# Patient Record
Sex: FEMALE | Race: OTHER
[De-identification: names, ages, dates, MRNs, and addresses within clinical notes are randomized per-mention and may not be internally consistent; named-entity substitution may affect disease eponyms.]

---

## 2020-03-18 ENCOUNTER — HOSPITAL ENCOUNTER (EMERGENCY)
Dept: HOSPITAL 62 - ER | Age: 40
Discharge: HOME | End: 2020-03-18
Payer: SELF-PAY

## 2020-03-18 VITALS — DIASTOLIC BLOOD PRESSURE: 65 MMHG | SYSTOLIC BLOOD PRESSURE: 119 MMHG

## 2020-03-18 DIAGNOSIS — A09: Primary | ICD-10-CM

## 2020-03-18 DIAGNOSIS — R10.84: ICD-10-CM

## 2020-03-18 DIAGNOSIS — R11.2: ICD-10-CM

## 2020-03-18 DIAGNOSIS — Z79.899: ICD-10-CM

## 2020-03-18 DIAGNOSIS — R10.817: ICD-10-CM

## 2020-03-18 LAB
ADD MANUAL DIFF: NO
ALBUMIN SERPL-MCNC: 5.2 G/DL (ref 3.5–5)
ALP SERPL-CCNC: 87 U/L (ref 38–126)
ANION GAP SERPL CALC-SCNC: 14 MMOL/L (ref 5–19)
APPEARANCE UR: (no result)
APTT PPP: YELLOW S
AST SERPL-CCNC: 35 U/L (ref 14–36)
BASOPHILS # BLD AUTO: 0 10^3/UL (ref 0–0.2)
BASOPHILS NFR BLD AUTO: 0.3 % (ref 0–2)
BILIRUB DIRECT SERPL-MCNC: 0 MG/DL (ref 0–0.4)
BILIRUB SERPL-MCNC: 0.9 MG/DL (ref 0.2–1.3)
BILIRUB UR QL STRIP: NEGATIVE
BUN SERPL-MCNC: 14 MG/DL (ref 7–20)
CALCIUM: 10.4 MG/DL (ref 8.4–10.2)
CHLORIDE SERPL-SCNC: 105 MMOL/L (ref 98–107)
CO2 SERPL-SCNC: 22 MMOL/L (ref 22–30)
EOSINOPHIL # BLD AUTO: 0.1 10^3/UL (ref 0–0.6)
EOSINOPHIL NFR BLD AUTO: 0.5 % (ref 0–6)
ERYTHROCYTE [DISTWIDTH] IN BLOOD BY AUTOMATED COUNT: 14.5 % (ref 11.5–14)
GLUCOSE SERPL-MCNC: 110 MG/DL (ref 75–110)
GLUCOSE UR STRIP-MCNC: NEGATIVE MG/DL
HCT VFR BLD CALC: 36.9 % (ref 36–47)
HGB BLD-MCNC: 12.8 G/DL (ref 12–15.5)
KETONES UR STRIP-MCNC: (no result) MG/DL
LYMPHOCYTES # BLD AUTO: 1 10^3/UL (ref 0.5–4.7)
LYMPHOCYTES NFR BLD AUTO: 6.8 % (ref 13–45)
MCH RBC QN AUTO: 29.1 PG (ref 27–33.4)
MCHC RBC AUTO-ENTMCNC: 34.6 G/DL (ref 32–36)
MCV RBC AUTO: 84 FL (ref 80–97)
MONOCYTES # BLD AUTO: 0.8 10^3/UL (ref 0.1–1.4)
MONOCYTES NFR BLD AUTO: 5.4 % (ref 3–13)
NEUTROPHILS # BLD AUTO: 12.2 10^3/UL (ref 1.7–8.2)
NEUTS SEG NFR BLD AUTO: 87 % (ref 42–78)
PH UR STRIP: 5 [PH] (ref 5–9)
PLATELET # BLD: 346 10^3/UL (ref 150–450)
POTASSIUM SERPL-SCNC: 4.5 MMOL/L (ref 3.6–5)
PROT SERPL-MCNC: 8.6 G/DL (ref 6.3–8.2)
PROT UR STRIP-MCNC: 100 MG/DL
RBC # BLD AUTO: 4.38 10^6/UL (ref 3.72–5.28)
SP GR UR STRIP: 1.03
TOTAL CELLS COUNTED % (AUTO): 100 %
UROBILINOGEN UR-MCNC: NEGATIVE MG/DL (ref ?–2)
WBC # BLD AUTO: 14.1 10^3/UL (ref 4–10.5)

## 2020-03-18 PROCEDURE — 81001 URINALYSIS AUTO W/SCOPE: CPT

## 2020-03-18 PROCEDURE — 96375 TX/PRO/DX INJ NEW DRUG ADDON: CPT

## 2020-03-18 PROCEDURE — 84703 CHORIONIC GONADOTROPIN ASSAY: CPT

## 2020-03-18 PROCEDURE — 96374 THER/PROPH/DIAG INJ IV PUSH: CPT

## 2020-03-18 PROCEDURE — 83690 ASSAY OF LIPASE: CPT

## 2020-03-18 PROCEDURE — 85025 COMPLETE CBC W/AUTO DIFF WBC: CPT

## 2020-03-18 PROCEDURE — 74177 CT ABD & PELVIS W/CONTRAST: CPT

## 2020-03-18 PROCEDURE — 99284 EMERGENCY DEPT VISIT MOD MDM: CPT

## 2020-03-18 PROCEDURE — 96361 HYDRATE IV INFUSION ADD-ON: CPT

## 2020-03-18 PROCEDURE — 36415 COLL VENOUS BLD VENIPUNCTURE: CPT

## 2020-03-18 PROCEDURE — 84702 CHORIONIC GONADOTROPIN TEST: CPT

## 2020-03-18 PROCEDURE — 80053 COMPREHEN METABOLIC PANEL: CPT

## 2020-03-18 NOTE — ER DOCUMENT REPORT
ED Medical Screen (RME)





- General


Chief Complaint: Vomiting


Stated Complaint: VOMITING


Time Seen by Provider: 03/18/20 17:53


Mode of Arrival: Wheelchair


Information source: Patient


Notes: 





39-year-old female presented to ED for complaint of upper abdominal pain nausea 

and vomiting.  Her son states that she has been vomiting constantly for the last

hour.  She states that everything she is eaten today is come back up.  She 

states the only thing she had to eat today as beans and rice nothing spicy.  She

states she did have some abdominal pain and feeling hot yesterday.  She states 

she does not smoke drink or use any alcohol.  She is alert and oriented but is 

in a lot of pain with dry heaves at this time.  She is also been having some 

diarrhea 3-4 stools today.

















I have greeted and performed a rapid initial assessment of this patient.  A 

comprehensive ED assessment and evaluation of the patient, analysis of test 

results and completion of medical decision making process will be conducted by 

an additional ED providers.





Physical Exam





- Vital signs


Vitals: 





                                        











Temp Pulse Resp BP Pulse Ox


 


 98 F   89   24 H  149/76 H  100 


 


 03/18/20 17:50  03/18/20 17:50  03/18/20 17:50  03/18/20 17:50  03/18/20 17:50














Course





- Vital Signs


Vital signs: 





                                        











Temp Pulse Resp BP Pulse Ox


 


 98 F   89   24 H  149/76 H  100 


 


 03/18/20 17:50  03/18/20 17:50  03/18/20 17:50  03/18/20 17:50  03/18/20 17:50

## 2020-03-18 NOTE — ER DOCUMENT REPORT
ED General





- General


Chief Complaint: Abdominal Pain


Stated Complaint: VOMITING


Time Seen by Provider: 03/18/20 17:53


Primary Care Provider: 


GITA BETHEA MD [Primary Care Provider] - Follow up as needed


Mode of Arrival: Wheelchair


Information source: Patient, Relative


TRAVEL OUTSIDE OF THE [place] IN LAST 30 DAYS: No





- HPI


Onset: Other - patient has had abdominal pains for a month but the pain worsened

in the last 2 days and is now associated with nausea and vomiting.


Onset/Duration: Gradual


Quality of pain: Sharp


Severity: Severe


Pain Level: 5


Associated symptoms: Diarrhea, Nausea, Vomiting


Exacerbated by: Food


Relieved by: Denies


Similar symptoms previously: No


Recently seen / treated by doctor: No


Notes: 





39 year old female with no significant PMH here for abdominal pain, nausea, 

vomiting, and diarrhea. The patient has been having abdominal pains for about a 

month but the pains dramatically worsened over the last 2 days and were 

associated with nausea, vomiting, and diarrhea today. The patient has not seen a

doctor in some time and she has never had an abdominal surgery. The patient 

denies urinary and vaginal symptoms.





- Related Data


Allergies/Adverse Reactions: 


                                        





No Known Allergies Allergy (Verified 03/18/20 17:55)


   








Home Medications: vitamins.  cholesterol.  anemia





Past Medical History





- General


Information source: Patient





- Social History


Smoking Status: Never Smoker


Chew tobacco use (# tins/day): No


Frequency of alcohol use: None


Drug Abuse: None


Lives with: Family


Family History: Reviewed & Not Pertinent


Patient has suicidal ideation: No


Patient has homicidal ideation: No





Review of Systems





- Review of Systems


Constitutional: No symptoms reported


EENT: No symptoms reported


Cardiovascular: No symptoms reported


Gastrointestinal: Abdominal pain, Diarrhea, Nausea, Vomiting


Genitourinary: No symptoms reported


Female Genitourinary: No symptoms reported


Musculoskeletal: No symptoms reported


Skin: No symptoms reported


Hematologic/Lymphatic: No symptoms reported


Neurological/Psychological: No symptoms reported


-: Yes All other systems reviewed and negative





Physical Exam





- Vital signs


Vitals: 


                                        











Temp Pulse Resp BP Pulse Ox


 


 98 F   89   24 H  149/76 H  100 


 


 03/18/20 17:50  03/18/20 17:50  03/18/20 17:50  03/18/20 17:50  03/18/20 17:50














- Notes


Notes: 





GENERAL: ill-appearing and in moderate distress, well-nourished


HEAD: Atraumatic, normocephalic.


EYES: Pupils equal round and reactive to light, extraocular movements intact, 

sclera anicteric, conjunctiva are normal.


ENT: Nares patent, oropharynx clear without exudates.  Moist mucous membranes.


NECK: Normal range of motion, supple without lymphadenopathy or JVD.


LUNGS: Breath sounds clear to auscultation bilaterally and equal.  No wheezes 

rales or rhonchi.


HEART: Regular rate and rhythm without murmurs, rubs or gallops.


ABDOMEN: Soft, moderate tenderness throughout but worse in RLQ, normoactive bow

el sounds.  No guarding, no rebound.  No masses appreciated.


EXTREMITIES: Normal range of motion, no pitting or edema.  No clubbing or 

cyanosis.


NEUROLOGICAL: Cranial nerves II through XII grossly intact.  Normal speech, 

normal gait.


PSYCH: Normal mood, normal affect.


SKIN: Warm, Dry, normal turgor, no rashes or lesions noted.





Course





- Re-evaluation


Re-evalutation: 





03/18/20 20:16


The patient is here for abdominal pain, nausea, vomiting. The patient was 

treated with fluids, zofran, morphine, dilaudid, toradol. CT scan consistent 

with a diarrheal illness. Patient likely has a viral syndrome. Will DC on Zofran

and Norco and have the patient follow up with a PCP. Strict ER return 

instructions given if patient worsens.








- Vital Signs


Vital signs: 


                                        











Temp Pulse Resp BP Pulse Ox


 


 98.3 F   89   20   122/61   100 


 


 03/18/20 20:14  03/18/20 20:14  03/18/20 20:14  03/18/20 20:14  03/18/20 20:14














- Laboratory


Result Diagrams: 


                                 03/18/20 18:27





                                 03/18/20 18:27


Laboratory results interpreted by me: 


                                        











  03/18/20 03/18/20 03/18/20





  18:27 18:27 19:10


 


WBC  14.1 H  


 


RDW  14.5 H  


 


Lymph % (Auto)  6.8 L  


 


Absolute Neuts (auto)  12.2 H  


 


Seg Neutrophils %  87.0 H  


 


Calcium   10.4 H 


 


Total Protein   8.6 H 


 


Albumin   5.2 H 


 


Urine Protein    100 H


 


Urine Ketones    TRACE H


 


Urine Ascorbic Acid    40 H














- Diagnostic Test


Radiology reviewed: Image reviewed, Reports reviewed





Discharge





- Discharge


Clinical Impression: 


Diarrhea


Qualifiers:


 Diarrhea type: infectious Qualified Code(s): A09 - Infectious gastroenteritis 

and colitis, unspecified





Abdominal pain


Qualifiers:


 Abdominal location: generalized Qualified Code(s): R10.84 - Generalized 

abdominal pain





Condition: Stable


Disposition: HOME, SELF-CARE


Instructions:  Abdominal Pain ([facility]), Diarrhea, Nonspecific ([facility])


Additional Instructions: 


Use Zofran as needed for nausea. Use over the counter generic Tylenol and Motrin

for pain. Use the prescribed Norco for pain not controlled with over the counter

medications. Drink plenty of fluids in the days to come. Eat a bland diet until 

symptoms pass. If you diarrhea persists for over a week, bring a sample with you

to a primary care doctor's office for stool testing. 


Prescriptions: 


Hydrocodone/Acetaminophen [Norco  Tablet] 1 each PO Q6H #10 tablet


Ondansetron [Zofran Odt 4 mg Tablet] 1 tab PO Q8H PRN #15 tab.rapdis


 PRN Reason: For Nausea/Vomiting


Referrals: 


GITA BETHEA MD [Primary Care Provider] - Follow up as needed

## 2020-03-18 NOTE — RADIOLOGY REPORT (SQ)
EXAM DESCRIPTION: 



CT ABDOMEN PELVIS WITH IV CONTRAST



COMPLETED DATE/TME:  03/18/2020 18:18



CLINICAL HISTORY: 



39 years, Female, eval for appendicitis or other cause of pain



COMPARISON:

None.



TECHNIQUE:

Contrast enhanced CT of the abdomen/pelvis was acquired. Images

were obtained after the uneventful administration of 72 mL of

Omnipaque 350 intravenous contrast.  Images stored on PACS.

 

All CT scanners at this facility use dose modulation, iterative

reconstruction, and/or weight based dosing when appropriate to

reduce radiation dose to as low as reasonably achievable (ALARA).





[facility]: Dose Right [facility]: CareDose   [facility]: Dose Right    [facility]:

Teradose 4D    [facility]: Smart Technologies



LIMITATIONS:

None.



FINDINGS:



Limited evaluation of the lower chest reveals clear lung bases.



The liver, spleen, pancreas, gallbladder, and both adrenal glands

appear normal.



Both kidneys enhance symmetrically. No hydronephrosis or

hydroureter. The urinary bladder is partially collapsed, thus its

evaluation is limited. A lobulated soft tissue density appears to

emanate from the superior aspect of the uterine fundus, best

visualized on image 34 of series 601, most likely indicating a

fundal fibroid.



The colon appears diffusely fluid-filled. However, no significant

wall thickening is noted. Appendix is not clearly visualized.

However, no pericecal inflammatory changes are appreciated.



Small fat-containing umbilical hernia is noted.



Vascular structures opacify with contrast normally. No suspicious

lymphadenopathy or drainable fluid collections are appreciated.



Bone windows show no destructive osseous lesions.





IMPRESSION:



Diffusely fluid-filled colon without significant wall thickening.

Correlate for diarrhea.



Fibroid uterus.

 

TECHNICAL DOCUMENTATION:



Quality ID # [id number]: Final reports with documentation of one or more

dose reduction techniques (e.g., Automated exposure control,

adjustment of the mA and/or kV according to patient size, use of

iterative reconstruction technique)



copyright 2011 [employer]- All Rights Reserved